# Patient Record
(demographics unavailable — no encounter records)

---

## 2024-11-22 NOTE — REVIEW OF SYSTEMS
[Fever] : fever [Fatigue] : fatigue [Chills] : chills [Nasal Congestion] : nasal congestion [GERD] : gerd [Myalgias] : myalgias [Postnasal Drip] : postnasal drip [Sinus Problems] : sinus problems [Hypertension] : ~T hypertension [Diabetes] : diabetes mellitus [Negative] : Pulmonary Hypertension [Abdominal Pain] : no abdominal pain [Nausea] : no nausea [Diarrhea] : no diarrhea [TextBox_113] : Breast Cancer [Dry Eyes] : no dryness of the eyes [Eye Irritation] : no ~T irritation of the eyes [Ear Disturbance] : no ear disturbance [Epistaxis] : no nosebleeds [Sore Throat] : no sore throat [Dry Mouth] : no dry mouth [Mouth Ulcers] : no mouth ulcers [Cough] : no cough [Sputum] : not coughing up ~M sputum [Hemoptysis] : no hemoptysis [Dyspnea] : no dyspnea [Chest Tightness] : no chest tightness [Pleuritic Pain] : no pleuritic pain [Frequent URIs] : no frequent upper respiratory infections [Wheezing] : no wheezing [Chest Discomfort] : no chest discomfort [PND] : no PND [Orthopnea] : no orthopnea [Dysrhythmia] : no dysrhythmia [Murmurs] : no murmurs were heard [Palpitations] : no palpitations [Edema] : ~T edema was not present [Claudication] : no intermittent claudication [Leg Cramps] : no leg cramps [Thyroid Problem] : no thyroid problem [de-identified] : on going allergy evaulation

## 2024-11-22 NOTE — HISTORY OF PRESENT ILLNESS
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 Detailed pathology noted in chart Management breast oncology  noted on [place] 7.5  weekly  Post COVID Aug 23  2023 still with  fatigue  pos  sputum  allergy sxs and active allergy shots working in garden pos  nasal congestion no fever does feel chest congestion or  wheeze  Post RSV  Mid Dec 2022 Allergy  issue biologic agent feels sinus sxs chronic  no fever chills no reported wheeze inc GERD sxs outside labs  reviewed creat 0.92 lytes nl chol 150 A1C 7.3 Vit 52  some episodes of feeling inability to take deep  breath No GERD sxs and pos response HFN with DUONEB remains on CPAP Completed Dental with implants upper with lower done  Patient is a post  Covid infection Patient is feeling much better but residual sxs mostly resolved Some mild chest congestion occasional headache fatigue Symptoms essentially resolved Complete monoclonal antibody infusion No fevers chills or sweats  Initial evaluation was January 22, 2021 complaining of sinus infection Subsequently was treated with p.o. antibiotic and underwent a Covid swab on January 22 which was negative for the virus She then developed recurrence of fever and noted that her brother was hospitalized at [facility] with a Covid infection of which she does have primary contact exposure With the development of fever additional symptoms, URI symptoms chest congestion and a high risk strongly recommended repeat Covid swab January 2018 repeat Covid PCR positive for infection Other than the above-noted symptoms she has not and fever that is persistent between 99 to just greater than 100 101 has persistent She also has some left ear tenderness  .  Smoking Status: never     Sleep study-severe obstructive sleep apnea Just recently started CPAP Does feel like there is a leak Overall status nocturnal symptoms improved with use of CPAP  asthma on AI management Stage completed recent upper endoscopy colonoscopy June 2022 Told to have 3 large precancerous polyps and will be scheduled for 1 year follow-up colonoscopy with GI reviewed blood work as noted [email or web address] CBC did not demonstrate eosinophils  [TextBox_100] : 9/25-26/2020 [TextBox_108] : 53 [TextBox_112] : <1 % [TextBox_116] : 87 [TextBox_160] : ResMed air touch F 20 medium  size

## 2024-11-22 NOTE — PROCEDURE
[FreeTextEntry1] :   NIOx  18  ppb WNL  11/22/24  TEE 11/22/24  Flow  rates WNL  No BD at FEV!  Tee 9/12/24 flow rates WNL  TEE 11/22/24 NIOX 23 ppb  WNL 9/12/24  NIOX   15  ppb WNL 7/25/24 TEE 7/25 24  Flow  rates WNL  No BD at FEV1  TEE 5/24/24 normal flow  rates  PFT April 18, 2024 Flow rates are normal Very minimal reduction in FVC 77% of predicted of no clinical significance Total lung capacity is normal at 81% predicted Decreased ERV 49% predicted secondary to truncal obesity Diffusion normal range 83% predicted Overall stable pulmonary physiology NIOX 17 normal range April 18, 2024   Chest x-ray PA lateral April 18, 2024 1 year follow-up New diagnosis history of breast cancer Cardiac size normal Lung fields are clear No parenchymal infiltrates pleural effusion or dominant pulmonary nodule no interval change compared to prior chest x-ray   NIOX  21  ppb althoug increase [id number] WNL  3/4/24 TEE 3/4/24 Flow  rates nl  no  decline  TEE 1/29/24 nl flow rates stable NIOX 7 ppb WNL  1/29/24  Spirometry December 29, 2023 Normal study No bronchodilator response in FEV1 47% response to bronchodilator Small airways Stable flow rates without decline   Preop surgical testing completed [facility] Breast cancer surgery EKG Heart rate 83 Normal sinus rhythm Possible left atrial enlargement CBC WBC 8.5 hemoglobin 14.5 hematocrit 45.0 platelet count 275,000 Serum electrolytes normal Potassium 4.8 Renal function normal Creatinine 0.91 Serum calcium 9.6 GFR 69 Hemoglobin A1c 6.5%   PFT  12/4/23  mild dec at FVC  ratio 83 nl  Lung volumes nl  ded ERV sec truncal obesity  DLCO 74 % WNL HGB 13.4 NIOX  7  ppb WNL  12/4/23  CXR PA Lat 9/1/23  post COVID clear lungs no evidence parenchymal infiltrates  NIOX  11 ppb nl  range  7/28/23  PFT 7/28/23 tee nl flow  rates No BD at FEV1  TLC 85 %  lung volumes nl  DLCO  83  % stable  HGB 14.5 interval improvement at pulmonary physiology  NIOX  13  ppb WNL  6/15/23 Spirometry Claire 15, 2023 Very mild reduction in flow rates with ratio 78 +30% response to bronchodilator at FEV1 Consistent with known diagnosis of asthma Overall stable pulmonary spirometric data CPAP data compliance report through June 14, 2023 90-day profile Usage 100% Hours greater than 9 AutoSet CPAP 6-18 cm H2O AHI 0.9   Chest x-ray PA lateral May 15, 2023 Chest congestion sinusitis Cardiac size normal Lung fields are clear No parenchymal infiltrates pleural effusions dominant pulmonary nodule Soft tissue bony structures unremarkable Impression clear lungs  TEE No BD 4/3/23  flow  rates nl stable without  decline  Spirometry PFT February 9, 2023 Flow rates are normal No response to bronchodilator at the FEV1 or small airways TLC minimally reduced 77% of predicted Diffusion just below normal range 67% predicted Hemoglobin 15.8 Very mild restrictive ventilatory impairment with a very minimal diffusion gas exchange impairment Interval improvement at flow rates  CPAP data compliance with ResMed Usage 100% Greater than 8 AutoSet CPAP 6 to 16 cm H2O AHI 1.1 Increase immunity Heated tubing Increase settings 6-18 cm H2O follow  [place] 12/12/22  Flow rates stable nl  no decline   Spirometry October 31, 2022 Mild FVC reduction Ratio 84 No response to bronchodilator at the FEV1  CPAP data review compliance study through number 12/2022 Usage 100% 1 hours greater than 8 AutoSet CPAP 6 to 16 cm H2O AHI 1.2  PFT 9/16/22 flow rates nl  Lung Volumes nl; TLC 80 % DLCO 68 % minimal  loss alveolar capillary units  HGB 14.1 NIOX  14  ppb WNL  9/15/22  Tee 8/5/22 flow rates nl  No decline NIOPX 12  ppb WNL  PFT 6/24/22 Mild restrictive ventilatory impairment DLCO 61 % with mod loss fx  alveolar  capillary units HGB 13.9 NIOX 11  ppb WNL 6/24/22  Pulmonary 6-minute walk exercise study June 24, 2022 Indication small decline in pulmonary function data RA nl study No RA desat  Chest x-ray PA lateral May 26, 2022 Cardiac size grossly normal Clear lung fields No parenchymal infiltrates pleural effusions dominant pulmonary nodules Soft tissue bony structures unremarkable Comparison April 21, 2021 demonstrates no interval change  Spirometry no bronchodilator May 26, 2022 FVC borderline reduced at 75% predicted with normal FEV1 81% predicted FEV1 FVC ratio 83 Data comparison to March 18,022 shows mild decline both within the realm of statistical stability  PFT 3/18/22 Flow rates nl  TLC 78 % pred  DLCO low nl 74 % pred  HGB 12.6 NIOX 9 ppb normal  range  PFT 12/15/21 Flow rates nl TLC 74 % DLCO 69 % mild restrictive ventilatory impairment with very minimal DLCO  gas exchange impairment HGB 13.3 NIOX 11 ppb nl range stable pulmonary physiology  PFT 7/15/21  Normal  flow rates TLC nl with nl  lung  volumes  Low  nl DLCO  72 % pred HGB 14.2  Tee No BD 7/26/21 normal flow rates   PFT 4/21/21 minimal reduction  flow  rates Mild reduction TLC 67 % Resistance increased and  sp  conductance is normal Mild  reduction DLCO 66 %  with loss fx alveolar  capillary units HGB 13.9  Chest x-ray PA LAT April 21, 2021  cardiac size nl Lung fields are clear No parenchymal trace pleural effusions dominant pulmonary nodule Soft tissue bony structures are unremarkable Lina mediastinum unremarkable Impression clear lungs No evidence for Covid pneumonia  Pulmonary 6-minute walk stress test 4/21/21 Baseline room air O2 saturation 96% Impression normal study Negative ethan desaturation No evidence of exercise-induced hypoxemia  Chest x-ray PA lateral February 26, 2021 Post Covid infection Normal cardiac size Faint parenchymal pulmonary opacity right lower lung zone Otherwise lungs are grossly clear Do not believe consistent with Covid pneumonia as it is relatively unchanged compared to chest x-ray of July 20, 2020  CPAP data through 8/4/22 RESMED Usage 100  % Average hours> 8 Auto CPAP settings 6 to 16 cm H2O AHI   1.8 Occasional air leak  Sleep Study 9/25/- 26 2020 Severe LUCAS  AHI 53 RDI 64  PFT Oct 12 2020 mild reduction flow rates No BD at FEV1 Normal lung volumes mild reduction Diffusion 70 %  HGB  14.0  PFT July 20 2020 normal spirometry No BD at FEV1 TLC 78 %  low but normal range mild reduction diffusion with loss fx  alveolar  capillary units  HGB 14.5  X-ray PA lateral July 20, 2020 Normal cardiac size Lung fields are clear without parenchymal infiltrates pleural effusions dominant pulmonary nodules Soft tissue bony structures grossly unremarkable Lina mediastinum grossly unremarkable No interval change dating back to chest x-rays of November 7, 2018  PFT 1/15/2020  Flow rates Normal Mild  restrictive ventilatory impairment  Moderate reduction Diffusion with loss fx  alveolar  capillary units  HGB 14.5  Chest x-ray PA lateral August 2, 2019 Cardiac size normal Grossly clear lung fields without parenchymal infiltrates pleural effusions dominant pulmonary nodules Cannot exclude some peribronchial cuffing right lower lung zone Rule out bronchiectatic findings But Nov CT CHEST 2018 no Bronchiectasis  EXAM: CT CHEST  PROCEDURE DATE: 01/23/2020    INTERPRETATION: CLINICAL INFORMATION: Rheumatoid arthritis. Evaluation for interstitial lung  disease.  COMPARISON: CT chest 11/28/2018  PROCEDURE:  CT of the Chest was performed without intravenous contrast.  Sagittal and coronal reformats were performed.  FINDINGS:  The central airways are patent. The lungs are clear. There is no pleural effusion or pneumothorax.  The heart is normal in size and there is no pericardial effusion. There are calcifications of the aorta  and coronary arteries. The thoracic aorta is nonaneurysmal. There is no mediastinal  lymphadenopathy. The visualized portions of the lower neck and chest wall are unremarkable. The  patient is status post cholecystectomy. The remaining visualized portions of the upper abdomen are  unremarkable. There are spinal degenerative changes.  IMPRESSION:  Clear lungs.

## 2024-11-22 NOTE — DISCUSSION/SUMMARY
[FreeTextEntry1] : Chronic persistent asthma History of sinus disease Status post antibiotic to be completed Stable pulmonary physiology 6-week follow-up  POST OP medical pulmonary clearance breast surgery [facility] completed   Tx with  Breast RT Diagnosis as noted below Based on clinical exam and review of preop data's Spirometrics data sleep  Patient is medically pulmonary cleared for scheduled breast cancer surgery Anesthesia General postanesthesia unit will transition until fully awake with CPAP device and patient states 1 was informed to bring her machine   Post COVID fatigue from Aug 23 2023 - f/u PFT NIOX  Asthma stable flow rates Post RSV Sinusitis-continue Flonase unable to tolerate Astelin and short-term antibiotic consider ARASH / ENT LUCAS on CPAP RESMED  COVID-19 infection without residual Mild restrictive ventilatory impairment with gas exchange impairment stable pulmonary  physiology Other history as noted above RA  SS DM noted per PMD labs HGBA1C 7.4 RAD Aspirin 81 mg daily and discontinue Vitamin D 2000 units daily  monoclonal antibody infusion Completed for COVID Infection COVID Vaccine completed PFIZER Vaccine protocol addressed new COVID  variant booster  Patient compliant with CPAP therapy.  Continue present settings.  Patient with demonstrated clinical benefit with daytime and nocturnal symptomatology improvement. Rx updated Rheum / ENDO up to date HGB A1C states now down to 6.5 declines flu  vaccine pneumonia vaccine declined NOTED ENDO additional MOJANoro will hold week of surgery /August unless interval  status change Notable increase CPAP pressures to 6-18 cm H2O for CPAP long term MTX sec RA

## 2024-11-22 NOTE — PHYSICAL EXAM
[No Acute Distress] : no acute distress [Low Lying Soft Palate] : low lying soft palate [III] : Mallampati Class: III [Normal Appearance] : normal appearance [No Neck Mass] : no neck mass [Normal Rate/Rhythm] : normal rate/rhythm [Normal S1, S2] : normal s1, s2 [No Murmurs] : no murmurs [No Resp Distress] : no resp distress [No Acc Muscle Use] : no acc muscle use [Normal Palpation] : normal palpation [Normal Rhythm and Effort] : normal rhythm and effort [Clear to Auscultation Bilaterally] : clear to auscultation bilaterally [No Abnormalities] : no abnormalities [Benign] : benign [Soft] : soft [No HSM] : no hsm [Normal Bowel Sounds] : normal bowel sounds [Normal Gait] : normal gait [No Clubbing] : no clubbing [No Cyanosis] : no cyanosis [No Edema] : no edema [FROM] : FROM [Normal Color/ Pigmentation] : normal color/ pigmentation [No Focal Deficits] : no focal deficits [Oriented x3] : oriented x3 [Normal Affect] : normal affect [TextBox_105] : Right hand 4 th digit r/o RA nodule vs cyst

## 2024-11-22 NOTE — REASON FOR VISIT
[Follow-Up] : a follow-up visit [Asthma] : asthma [Sleep Apnea] : sleep apnea [TextBox_44] : Post COVID Hx,

## 2025-02-28 NOTE — PROCEDURE
[FreeTextEntry1] : PFT 2/28/25  minimal OAD  No BD at FEV1 pos BD at small airways mild restrictive ventilatory impairment  with TLC 73 % DLCO 84 % WNL  HGB 15.1 NIOX 18 stable within normal limits February 28, 2025 Chest x-ray PA lateral February 28, 2025 Indication fatigue tired Heart size normal No parenchymal infiltrates pleural effusion dominant pulmonary nodules Soft tissue bony structures unremarkable Lina mediastinum unremarkable No interval change between chest x-ray dating back to 4/19/2024  CPAP data compliance through February 27, 2025 Usage 83% Hours greater than 8 edition  AutoSet CPAP 6 to 18 cm H2O AHI 5.9 Positive air leak  NIOx  18  ppb WNL  11/22/24  TEE 11/22/24  Flow  rates WNL  No BD at FEV!  Tee 9/12/24 flow rates WNL  TEE 11/22/24 NIOX 23 ppb  WNL 9/12/24  NIOX   15  ppb WNL 7/25/24 TEE 7/25 24  Flow  rates WNL  No BD at FEV1  TEE 5/24/24 normal flow  rates  PFT April 18, 2024 Flow rates are normal Very minimal reduction in FVC 77% of predicted of no clinical significance Total lung capacity is normal at 81% predicted Decreased ERV 49% predicted secondary to truncal obesity Diffusion normal range 83% predicted Overall stable pulmonary physiology NIOX 17 normal range April 18, 2024   Chest x-ray PA lateral April 18, 2024 1 year follow-up New diagnosis history of breast cancer Cardiac size normal Lung fields are clear No parenchymal infiltrates pleural effusion or dominant pulmonary nodule no interval change compared to prior chest x-ray   NIOX  21  ppb althoug increase [id number] WNL  3/4/24 TEE 3/4/24 Flow  rates nl  no  decline  TEE 1/29/24 nl flow rates stable NIOX 7 ppb WNL  1/29/24  Spirometry December 29, 2023 Normal study No bronchodilator response in FEV1 47% response to bronchodilator Small airways Stable flow rates without decline   Preop surgical testing completed [facility] Breast cancer surgery EKG Heart rate 83 Normal sinus rhythm Possible left atrial enlargement CBC WBC 8.5 hemoglobin 14.5 hematocrit 45.0 platelet count 275,000 Serum electrolytes normal Potassium 4.8 Renal function normal Creatinine 0.91 Serum calcium 9.6 GFR 69 Hemoglobin A1c 6.5%   PFT  12/4/23  mild dec at FVC  ratio 83 nl  Lung volumes nl  ded ERV sec truncal obesity  DLCO 74 % WNL HGB 13.4 NIOX  7  ppb WNL  12/4/23  CXR PA Lat 9/1/23  post COVID clear lungs no evidence parenchymal infiltrates  NIOX  11 ppb nl  range  7/28/23  PFT 7/28/23 tee nl flow  rates No BD at FEV1  TLC 85 %  lung volumes nl  DLCO  83  % stable  HGB 14.5 interval improvement at pulmonary physiology  NIOX  13  ppb WNL  6/15/23 Spirometry [place] 15, 2023 Very mild reduction in flow rates with ratio 78 +30% response to bronchodilator at FEV1 Consistent with known diagnosis of asthma Overall stable pulmonary spirometric data CPAP data compliance report through June 14, 2023 90-day profile Usage 100% Hours greater than 9 AutoSet CPAP 6-18 cm H2O AHI 0.9   Chest x-ray PA lateral May 15, 2023 Chest congestion sinusitis Cardiac size normal Lung fields are clear No parenchymal infiltrates pleural effusions dominant pulmonary nodule Soft tissue bony structures unremarkable Impression clear lungs  TEE No BD 4/3/23  flow  rates nl stable without  decline  Spirometry PFT February 9, 2023 Flow rates are normal No response to bronchodilator at the FEV1 or small airways TLC minimally reduced 77% of predicted Diffusion just below normal range 67% predicted Hemoglobin 15.8 Very mild restrictive ventilatory impairment with a very minimal diffusion gas exchange impairment Interval improvement at flow rates  CPAP data compliance with ResMed Usage 100% Greater than 8 AutoSet CPAP 6 to 16 cm H2O AHI 1.1 Increase immunity Heated tubing Increase settings 6-18 cm H2O follow  [place] 12/12/22  Flow rates stable nl  no decline   Spirometry October 31, 2022 Mild FVC reduction Ratio 84 No response to bronchodilator at the FEV1  CPAP data review compliance study through number 12/2022 Usage 100% 1 hours greater than 8 AutoSet CPAP 6 to 16 cm H2O AHI 1.2  PFT 9/16/22 flow rates nl  Lung Volumes nl; TLC 80 % DLCO 68 % minimal  loss alveolar capillary units  HGB 14.1 NIOX  14  ppb WNL  9/15/22  [place] 8/5/22 flow rates nl  No decline NIOPX 12  ppb WNL  PFT 6/24/22 Mild restrictive ventilatory impairment DLCO 61 % with mod loss fx  alveolar  capillary units HGB 13.9 NIOX 11  ppb WNL 6/24/22  Pulmonary 6-minute walk exercise study June 24, 2022 Indication small decline in pulmonary function data RA nl study No RA desat  Chest x-ray PA lateral May 26, 2022 Cardiac size grossly normal Clear lung fields No parenchymal infiltrates pleural effusions dominant pulmonary nodules Soft tissue bony structures unremarkable Comparison April 21, 2021 demonstrates no interval change  Spirometry no bronchodilator May 26, 2022 FVC borderline reduced at 75% predicted with normal FEV1 81% predicted FEV1 FVC ratio 83 Data comparison to March 18,022 shows mild decline both within the realm of statistical stability  PFT 3/18/22 Flow rates nl  TLC 78 % pred  DLCO low nl 74 % pred  HGB 12.6 NIOX 9 ppb normal  range  PFT 12/15/21 Flow rates nl TLC 74 % DLCO 69 % mild restrictive ventilatory impairment with very minimal DLCO  gas exchange impairment HGB 13.3 NIOX 11 ppb nl range stable pulmonary physiology  PFT 7/15/21  Normal  flow rates TLC nl with nl  lung  volumes  Low  nl DLCO  72 % pred HGB 14.2  [place] No BD 7/26/21 normal flow rates   PFT 4/21/21 minimal reduction  flow  rates Mild reduction TLC 67 % Resistance increased and  sp  conductance is normal Mild  reduction DLCO 66 %  with loss fx alveolar  capillary units HGB 13.9  Chest x-ray PA LAT April 21, 2021  cardiac size nl Lung fields are clear No parenchymal trace pleural effusions dominant pulmonary nodule Soft tissue bony structures are unremarkable Lina mediastinum unremarkable Impression clear lungs No evidence for Covid pneumonia  Pulmonary 6-minute walk stress test 4/21/21 Baseline room air O2 saturation 96% Impression normal study Negative ethan desaturation No evidence of exercise-induced hypoxemia  Chest x-ray PA lateral February 26, 2021 Post Covid infection Normal cardiac size Faint parenchymal pulmonary opacity right lower lung zone Otherwise lungs are grossly clear Do not believe consistent with Covid pneumonia as it is relatively unchanged compared to chest x-ray of July 20, 2020  CPAP data through 8/4/22 RESMED Usage 100  % Average hours> 8 Auto CPAP settings 6 to 16 cm H2O AHI   1.8 Occasional air leak  Sleep Study 9/25/- 26 2020 Severe LUCAS  AHI 53 RDI 64  PFT Oct 12 2020 mild reduction flow rates No BD at FEV1 Normal lung volumes mild reduction Diffusion 70 %  HGB  14.0  PFT July 20 2020 normal spirometry No BD at FEV1 TLC 78 %  low but normal range mild reduction diffusion with loss fx  alveolar  capillary units  HGB 14.5  X-ray PA lateral July 20, 2020 Normal cardiac size Lung fields are clear without parenchymal infiltrates pleural effusions dominant pulmonary nodules Soft tissue bony structures grossly unremarkable Lina mediastinum grossly unremarkable No interval change dating back to chest x-rays of November 7, 2018  PFT 1/15/2020  Flow rates Normal Mild  restrictive ventilatory impairment  Moderate reduction Diffusion with loss fx  alveolar  capillary units  HGB 14.5  Chest x-ray PA lateral August 2, 2019 Cardiac size normal Grossly clear lung fields without parenchymal infiltrates pleural effusions dominant pulmonary nodules Cannot exclude some peribronchial cuffing right lower lung zone Rule out bronchiectatic findings But Nov CT CHEST 2018 no Bronchiectasis  EXAM: CT CHEST  PROCEDURE DATE: 01/23/2020    INTERPRETATION: CLINICAL INFORMATION: Rheumatoid arthritis. Evaluation for interstitial lung  disease.  COMPARISON: CT chest 11/28/2018  PROCEDURE:  CT of the Chest was performed without intravenous contrast.  Sagittal and coronal reformats were performed.  FINDINGS:  The central airways are patent. The lungs are clear. There is no pleural effusion or pneumothorax.  The heart is normal in size and there is no pericardial effusion. There are calcifications of the aorta  and coronary arteries. The thoracic aorta is nonaneurysmal. There is no mediastinal  lymphadenopathy. The visualized portions of the lower neck and chest wall are unremarkable. The  patient is status post cholecystectomy. The remaining visualized portions of the upper abdomen are  unremarkable. There are spinal degenerative changes.  IMPRESSION:  Clear lungs.

## 2025-02-28 NOTE — DISCUSSION/SUMMARY
[FreeTextEntry1] : Chronic persistent asthma History of sinus disease Status post antibiotic to be completed Stable pulmonary physiology 6-week follow-up   Asthma stable flow rates Post RSV Sinusitis-continue Flonase unable to tolerate Astelin and short-term antibiotic consider ARASH / ENT LUCAS on CPAP RESMED readdress mask request patient to come into the office for CPAP initiation visit  COVID-19 infection without residual Mild restrictive ventilatory impairment with gas exchange impairment stable pulmonary  physiology Other history as noted above RA  SS DM noted per PMD labs HGBA1C 7.4 RAD Aspirin 81 mg daily and discontinue Vitamin D 2000 units daily  monoclonal antibody infusion Completed for COVID Infection COVID Vaccine completed PFIZER Vaccine protocol addressed new COVID  variant booster  Patient compliant with CPAP therapy.  Continue present settings.  Patient with demonstrated clinical benefit with daytime and nocturnal symptomatology improvement. Rx updated Rheum / ENDO up to date HGB A1C states now about 7.4 declines flu  vaccine pneumonia vaccine declined  Notable increase CPAP pressures to 6-18 cm H2O for CPAP long term MTX sec RA

## 2025-02-28 NOTE — REVIEW OF SYSTEMS
[Fever] : fever [Fatigue] : fatigue [Chills] : chills [Nasal Congestion] : nasal congestion [GERD] : gerd [Myalgias] : myalgias [Postnasal Drip] : postnasal drip [Sinus Problems] : sinus problems [Hypertension] : ~T hypertension [Diabetes] : diabetes mellitus [Negative] : Pulmonary Hypertension [Abdominal Pain] : no abdominal pain [Nausea] : no nausea [Diarrhea] : no diarrhea [TextBox_113] : Breast Cancer [Dry Eyes] : no dryness of the eyes [Eye Irritation] : no ~T irritation of the eyes [Ear Disturbance] : no ear disturbance [Epistaxis] : no nosebleeds [Sore Throat] : no sore throat [Dry Mouth] : no dry mouth [Mouth Ulcers] : no mouth ulcers [Cough] : no cough [Sputum] : not coughing up ~M sputum [Hemoptysis] : no hemoptysis [Dyspnea] : no dyspnea [Chest Tightness] : no chest tightness [Pleuritic Pain] : no pleuritic pain [Frequent URIs] : no frequent upper respiratory infections [Wheezing] : no wheezing [Chest Discomfort] : no chest discomfort [PND] : no PND [Orthopnea] : no orthopnea [Dysrhythmia] : no dysrhythmia [Murmurs] : no murmurs were heard [Palpitations] : no palpitations [Edema] : ~T edema was not present [Claudication] : no intermittent claudication [Leg Cramps] : no leg cramps [Thyroid Problem] : no thyroid problem [de-identified] : on going allergy evaulation

## 2025-02-28 NOTE — HISTORY OF PRESENT ILLNESS
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 Detailed pathology noted in chart Management breast oncology  noted on Sun[place]o 7.5  weekly  Post COVID Aug 23  2023 still with  fatigue  pos  sputum  allergy sxs and active allergy shots working in garden pos  nasal congestion no fever does feel chest congestion or  wheeze  Post RSV  Mid Dec 2022 Allergy  issue biologic agent feels sinus sxs chronic  no fever chills no reported wheeze inc GERD sxs outside labs  reviewed creat 0.92 lytes nl chol 150 A1C 7.3 Vit 52  some episodes of feeling inability to take deep  breath No GERD sxs and pos response HFN with DUONEB remains on CPAP Completed Dental with implants upper with lower done  Patient is a post  Covid infection Patient is feeling much better but residual sxs mostly resolved Some mild chest congestion occasional headache fatigue Symptoms essentially resolved Complete monoclonal antibody infusion No fevers chills or sweats  Initial evaluation was January 22, 2021 complaining of sinus infection Subsequently was treated with p.o. antibiotic and underwent a Covid swab on January 22 which was negative for the virus She then developed recurrence of fever and noted that her brother was hospitalized at [place] with a Covid infection of which she does have primary contact exposure With the development of fever additional symptoms, URI symptoms chest congestion and a high risk strongly recommended repeat Covid swab January 2018 repeat Covid PCR positive for infection Other than the above-noted symptoms she has not and fever that is persistent between 99 to just greater than 100 101 has persistent She also has some left ear tenderness  .  Smoking Status: never     Sleep study-severe obstructive sleep apnea Just recently started CPAP Does feel like there is a leak Overall status nocturnal symptoms improved with use of CPAP  asthma on AI management Stage completed recent upper endoscopy colonoscopy June 2022 Told to have 3 large precancerous polyps and will be scheduled for 1 year follow-up colonoscopy with GI reviewed blood work as noted [place] CBC did not demonstrate eosinophils  [TextBox_100] : 9/25-26/2020 [TextBox_108] : 53 [TextBox_112] : <1 % [TextBox_116] : 87 [TextBox_160] : ResMed air touch F 20 medium  size

## 2025-03-03 NOTE — PROCEDURE
[FreeTextEntry1] : Pt seen in Drs office for a CPAP I & M. Vitals taken and were stable at this time.  Device S/N & Modem # - [id number] Mask: Airtouch f20 med ffm    Pt using CPAP-(s11 ResMed) and it is currently set at (6-18)cm.    Problem- Pt stated that she needs help adjusting the HH. Mask is also leaking from time to time. She also mentioned that the air flow is cut off at times from her HH.   Resolution- Pt was taught how to adjust her HH and tube temperature so that she can avoid rain out. Her current mask size is too big for her. She needs to use a small cushion instead of a med. I showed her how to maneuver the port that connects her tubing at the back of her machine as well as how to check that the HH chamber connections are not folded when inserting it.    Pt was shown how to use their machine and supplies, and they were able to return demonstrate usage. Pt using a [employer] is pts [employer] company.  They also were instructed on how to clean all supplies and how to obtain new ones. She stated she understood all directions.  
Never smoker

## 2025-05-19 NOTE — PROCEDURE
[FreeTextEntry1] : NIOX 17 normal range May 19, 2025 Spirometry May 19, 2025 Flow rates normal No bronchodilator testing FEV1  CPAP data compliance through May 18, 2025 Usage 60% Hours greater than 89 AutoSet CPAP 16 to 18 cm H2O AHI 6.1 Positive mask leak Patient states he is more comfortable with the fullface mask and tolerates the air leak  PFT 2/28/25  minimal OAD  No BD at FEV1 pos BD at small airways mild restrictive ventilatory impairment  with TLC 73 % DLCO 84 % WNL  HGB 15.1 NIOX 18 stable within normal limits February 28, 2025 Chest x-ray PA lateral February 28, 2025 Indication fatigue tired Heart size normal No parenchymal infiltrates pleural effusion dominant pulmonary nodules Soft tissue bony structures unremarkable Lina mediastinum unremarkable No interval change between chest x-ray dating back to 4/19/2024  CPAP data compliance through February 27, 2025 Usage 83% Hours greater than 8 edition  AutoSet CPAP 6 to 18 cm H2O AHI 5.9 Positive air leak  NIOx  18  ppb WNL  11/22/24  TEE 11/22/24  Flow  rates WNL  No BD at FEV!  Tee 9/12/24 flow rates WNL  TEE 11/22/24 NIOX 23 ppb  WNL 9/12/24  NIOX   15  ppb WNL 7/25/24 TEE 7/25 24  Flow  rates WNL  No BD at FEV1  TEE 5/24/24 normal flow  rates  PFT April 18, 2024 Flow rates are normal Very minimal reduction in FVC 77% of predicted of no clinical significance Total lung capacity is normal at 81% predicted Decreased ERV 49% predicted secondary to truncal obesity Diffusion normal range 83% predicted Overall stable pulmonary physiology NIOX 17 normal range April 18, 2024   Chest x-ray PA lateral April 18, 2024 1 year follow-up New diagnosis history of breast cancer Cardiac size normal Lung fields are clear No parenchymal infiltrates pleural effusion or dominant pulmonary nodule no interval change compared to prior chest x-ray   NIOX  21  ppb althoug increase [id number] WNL  3/4/24 TEE 3/4/24 Flow  rates nl  no  decline  TEE 1/29/24 nl flow rates stable NIOX 7 ppb WNL  1/29/24  Spirometry December 29, 2023 Normal study No bronchodilator response in FEV1 47% response to bronchodilator Small airways Stable flow rates without decline   Preop surgical testing completed [facility] Breast cancer surgery EKG Heart rate 83 Normal sinus rhythm Possible left atrial enlargement CBC WBC 8.5 hemoglobin 14.5 hematocrit 45.0 platelet count 275,000 Serum electrolytes normal Potassium 4.8 Renal function normal Creatinine 0.91 Serum calcium 9.6 GFR 69 Hemoglobin A1c 6.5%   PFT  12/4/23  mild dec at FVC  ratio 83 nl  Lung volumes nl  ded ERV sec truncal obesity  DLCO 74 % WNL HGB 13.4 NIOX  7  ppb WNL  12/4/23  CXR PA Lat 9/1/23  post COVID clear lungs no evidence parenchymal infiltrates  NIOX  11 ppb nl  range  7/28/23  PFT 7/28/23 tee nl flow  rates No BD at FEV1  TLC 85 %  lung volumes nl  DLCO  83  % stable  HGB 14.5 interval improvement at pulmonary physiology  NIOX  13  ppb WNL  6/15/23 Spirometry Claire 15, 2023 Very mild reduction in flow rates with ratio 78 +30% response to bronchodilator at FEV1 Consistent with known diagnosis of asthma Overall stable pulmonary spirometric data CPAP data compliance report through June 14, 2023 90-day profile Usage 100% Hours greater than 9 AutoSet CPAP 6-18 cm H2O AHI 0.9   Chest x-ray PA lateral May 15, 2023 Chest congestion sinusitis Cardiac size normal Lung fields are clear No parenchymal infiltrates pleural effusions dominant pulmonary nodule Soft tissue bony structures unremarkable Impression clear lungs  TEE No BD 4/3/23  flow  rates nl stable without  decline  Spirometry PFT February 9, 2023 Flow rates are normal No response to bronchodilator at the FEV1 or small airways TLC minimally reduced 77% of predicted Diffusion just below normal range 67% predicted Hemoglobin 15.8 Very mild restrictive ventilatory impairment with a very minimal diffusion gas exchange impairment Interval improvement at flow rates  CPAP data compliance with ResMed Usage 100% Greater than 8 AutoSet CPAP 6 to 16 cm H2O AHI 1.1 Increase immunity Heated tubing Increase settings 6-18 cm H2O follow  [place] 12/12/22  Flow rates stable nl  no decline   Spirometry October 31, 2022 Mild FVC reduction Ratio 84 No response to bronchodilator at the FEV1  CPAP data review compliance study through number 12/2022 Usage 100% 1 hours greater than 8 AutoSet CPAP 6 to 16 cm H2O AHI 1.2  PFT 9/16/22 flow rates nl  Lung Volumes nl; TLC 80 % DLCO 68 % minimal  loss alveolar capillary units  HGB 14.1 NIOX  14  ppb WNL  9/15/22  Tee 8/5/22 flow rates nl  No decline NIOPX 12  ppb WNL  PFT 6/24/22 Mild restrictive ventilatory impairment DLCO 61 % with mod loss fx  alveolar  capillary units HGB 13.9 NIOX 11  ppb WNL 6/24/22  Pulmonary 6-minute walk exercise study June 24, 2022 Indication small decline in pulmonary function data RA nl study No RA desat  Chest x-ray PA lateral May 26, 2022 Cardiac size grossly normal Clear lung fields No parenchymal infiltrates pleural effusions dominant pulmonary nodules Soft tissue bony structures unremarkable Comparison April 21, 2021 demonstrates no interval change  Spirometry no bronchodilator May 26, 2022 FVC borderline reduced at 75% predicted with normal FEV1 81% predicted FEV1 FVC ratio 83 Data comparison to March 18,022 shows mild decline both within the realm of statistical stability  PFT 3/18/22 Flow rates nl  TLC 78 % pred  DLCO low nl 74 % pred  HGB 12.6 NIOX 9 ppb normal  range  PFT 12/15/21 Flow rates nl TLC 74 % DLCO 69 % mild restrictive ventilatory impairment with very minimal DLCO  gas exchange impairment HGB 13.3 NIOX 11 ppb nl range stable pulmonary physiology  PFT 7/15/21  Normal  flow rates TLC nl with nl  lung  volumes  Low  nl DLCO  72 % pred HGB 14.2  Tee No BD 7/26/21 normal flow rates   PFT 4/21/21 minimal reduction  flow  rates Mild reduction TLC 67 % Resistance increased and  sp  conductance is normal Mild  reduction DLCO 66 %  with loss fx alveolar  capillary units HGB 13.9  Chest x-ray PA LAT April 21, 2021  cardiac size nl Lung fields are clear No parenchymal trace pleural effusions dominant pulmonary nodule Soft tissue bony structures are unremarkable Lina mediastinum unremarkable Impression clear lungs No evidence for Covid pneumonia  Pulmonary 6-minute walk stress test 4/21/21 Baseline room air O2 saturation 96% Impression normal study Negative ethan desaturation No evidence of exercise-induced hypoxemia  Chest x-ray PA lateral February 26, 2021 Post Covid infection Normal cardiac size Faint parenchymal pulmonary opacity right lower lung zone Otherwise lungs are grossly clear Do not believe consistent with Covid pneumonia as it is relatively unchanged compared to chest x-ray of July 20, 2020  CPAP data through 8/4/22 RESMED Usage 100  % Average hours> 8 Auto CPAP settings 6 to 16 cm H2O AHI   1.8 Occasional air leak  Sleep Study 9/25/- 26 2020 Severe LUCAS  AHI 53 RDI 64  PFT Oct 12 2020 mild reduction flow rates No BD at FEV1 Normal lung volumes mild reduction Diffusion 70 %  HGB  14.0  PFT July 20 2020 normal spirometry No BD at FEV1 TLC 78 %  low but normal range mild reduction diffusion with loss fx  alveolar  capillary units  HGB 14.5  X-ray PA lateral July 20, 2020 Normal cardiac size Lung fields are clear without parenchymal infiltrates pleural effusions dominant pulmonary nodules Soft tissue bony structures grossly unremarkable Lina mediastinum grossly unremarkable No interval change dating back to chest x-rays of November 7, 2018  PFT 1/15/2020  Flow rates Normal Mild  restrictive ventilatory impairment  Moderate reduction Diffusion with loss fx  alveolar  capillary units  HGB 14.5  Chest x-ray PA lateral August 2, 2019 Cardiac size normal Grossly clear lung fields without parenchymal infiltrates pleural effusions dominant pulmonary nodules Cannot exclude some peribronchial cuffing right lower lung zone Rule out bronchiectatic findings But Nov CT CHEST 2018 no Bronchiectasis  EXAM: CT CHEST  PROCEDURE DATE: 01/23/2020    INTERPRETATION: CLINICAL INFORMATION: Rheumatoid arthritis. Evaluation for interstitial lung  disease.  COMPARISON: CT chest 11/28/2018  PROCEDURE:  CT of the Chest was performed without intravenous contrast.  Sagittal and coronal reformats were performed.  FINDINGS:  The central airways are patent. The lungs are clear. There is no pleural effusion or pneumothorax.  The heart is normal in size and there is no pericardial effusion. There are calcifications of the aorta  and coronary arteries. The thoracic aorta is nonaneurysmal. There is no mediastinal  lymphadenopathy. The visualized portions of the lower neck and chest wall are unremarkable. The  patient is status post cholecystectomy. The remaining visualized portions of the upper abdomen are  unremarkable. There are spinal degenerative changes.  IMPRESSION:  Clear lungs.

## 2025-05-19 NOTE — HISTORY OF PRESENT ILLNESS
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 Detailed pathology noted in chart Management breast oncology  noted on [place] 7.5  weekly  Post COVID Aug 23  2023 still with  fatigue  pos  sputum  allergy sxs and active allergy shots working in garden pos  nasal congestion no fever does feel chest congestion or  wheeze  Post RSV  Mid Dec 2022 Allergy  issue biologic agent feels sinus sxs chronic  no fever chills no reported wheeze inc GERD sxs outside labs  reviewed creat 0.92 lytes nl chol 150 A1C 7.3 Vit 52  some episodes of feeling inability to take deep  breath No GERD sxs and pos response HFN with DUONEB remains on CPAP Completed Dental with implants upper with lower done  Patient is a post  Covid infection Patient is feeling much better but residual sxs mostly resolved Some mild chest congestion occasional headache fatigue Symptoms essentially resolved Complete monoclonal antibody infusion No fevers chills or sweats  Initial evaluation was January 22, 2021 complaining of sinus infection Subsequently was treated with p.o. antibiotic and underwent a Covid swab on January 22 which was negative for the virus She then developed recurrence of fever and noted that her brother was hospitalized at [place] with a Covid infection of which she does have primary contact exposure With the development of fever additional symptoms, URI symptoms chest congestion and a high risk strongly recommended repeat Covid swab January 2018 repeat Covid PCR positive for infection Other than the above-noted symptoms she has not and fever that is persistent between 99 to just greater than 100 101 has persistent She also has some left ear tenderness  .  Smoking Status: never     Sleep study-severe obstructive sleep apnea Just recently started CPAP Does feel like there is a leak Overall status nocturnal symptoms improved with use of CPAP  asthma on AI management Stage completed recent upper endoscopy colonoscopy June 2022 Told to have 3 large precancerous polyps and will be scheduled for 1 year follow-up colonoscopy with GI reviewed blood work as noted [place] CBC did not demonstrate eosinophils  [TextBox_100] : 9/25-26/2020 [TextBox_108] : 53 [TextBox_112] : <1 % [TextBox_116] : 87 [TextBox_160] : ResMed air touch F 20 medium  size

## 2025-05-19 NOTE — REVIEW OF SYSTEMS
[Fever] : fever [Fatigue] : fatigue [Chills] : chills [Nasal Congestion] : nasal congestion [GERD] : gerd [Myalgias] : myalgias [Postnasal Drip] : postnasal drip [Sinus Problems] : sinus problems [Hypertension] : ~T hypertension [Diabetes] : diabetes mellitus [Negative] : Pulmonary Hypertension [Abdominal Pain] : no abdominal pain [Nausea] : no nausea [Diarrhea] : no diarrhea [TextBox_113] : Breast Cancer [Dry Eyes] : no dryness of the eyes [Eye Irritation] : no ~T irritation of the eyes [Ear Disturbance] : no ear disturbance [Epistaxis] : no nosebleeds [Sore Throat] : no sore throat [Dry Mouth] : no dry mouth [Mouth Ulcers] : no mouth ulcers [Cough] : no cough [Sputum] : not coughing up ~M sputum [Hemoptysis] : no hemoptysis [Dyspnea] : no dyspnea [Chest Tightness] : no chest tightness [Pleuritic Pain] : no pleuritic pain [Frequent URIs] : no frequent upper respiratory infections [Wheezing] : no wheezing [Chest Discomfort] : no chest discomfort [PND] : no PND [Orthopnea] : no orthopnea [Dysrhythmia] : no dysrhythmia [Murmurs] : no murmurs were heard [Palpitations] : no palpitations [Edema] : ~T edema was not present [Claudication] : no intermittent claudication [Leg Cramps] : no leg cramps [Thyroid Problem] : no thyroid problem [de-identified] : on going allergy evaulation